# Patient Record
Sex: FEMALE | Employment: PART TIME | URBAN - METROPOLITAN AREA
[De-identification: names, ages, dates, MRNs, and addresses within clinical notes are randomized per-mention and may not be internally consistent; named-entity substitution may affect disease eponyms.]

---

## 2024-09-27 ENCOUNTER — OFFICE VISIT (OUTPATIENT)
Dept: OBGYN CLINIC | Facility: CLINIC | Age: 19
End: 2024-09-27
Payer: COMMERCIAL

## 2024-09-27 VITALS
BODY MASS INDEX: 22.22 KG/M2 | HEIGHT: 63 IN | WEIGHT: 125.4 LBS | SYSTOLIC BLOOD PRESSURE: 114 MMHG | DIASTOLIC BLOOD PRESSURE: 70 MMHG

## 2024-09-27 DIAGNOSIS — Z30.011 ENCOUNTER FOR BCP (BIRTH CONTROL PILLS) INITIAL PRESCRIPTION: ICD-10-CM

## 2024-09-27 DIAGNOSIS — N92.6 IRREGULAR PERIODS: ICD-10-CM

## 2024-09-27 DIAGNOSIS — Z11.3 ROUTINE SCREENING FOR STI (SEXUALLY TRANSMITTED INFECTION): ICD-10-CM

## 2024-09-27 DIAGNOSIS — Z12.4 CERVICAL CANCER SCREENING: ICD-10-CM

## 2024-09-27 DIAGNOSIS — Z01.419 ENCOUNTER FOR ANNUAL ROUTINE GYNECOLOGICAL EXAMINATION: Primary | ICD-10-CM

## 2024-09-27 PROCEDURE — S0610 ANNUAL GYNECOLOGICAL EXAMINA: HCPCS | Performed by: OBSTETRICS & GYNECOLOGY

## 2024-09-27 RX ORDER — NORETHINDRONE ACETATE AND ETHINYL ESTRADIOL 1MG-20(21)
1 KIT ORAL DAILY
Qty: 28 TABLET | Refills: 3 | Status: SHIPPED | OUTPATIENT
Start: 2024-09-27

## 2024-09-27 NOTE — PATIENT INSTRUCTIONS
Starting Birth Control Pills:   - start after next period starts   - timing of starting pills - begin at start of pack  you can start first day of period and it is immediately effective as contraception, or   you can start weekend after period, and it is not effective for first 2 weeks as a birth control method, use condoms or abstain     - Maintain healthy weight with BMI ideally between 18-25.    - Eat a healthy diet, including multiple servings of vegetables and fruits, as well as lean protein sources.  - Get at least 150 minutes of moderate aerobic activity or 75 minutes of vigorous aerobic activity a week, or a combination of moderate and vigorous activity.  Greater amounts of exercise will provide an even greater health benefit.   - Ensure diet provides 1200mg of Calcium daily (divided) and 800IU of vitamin D, or take supplements to meet this.  - Safe sex practices recommended.    - Resources - information on birth control and sexually transmitted infections - www.bedsider.org            - information on sexually transmitted infections - www.cdc.gov/std/

## 2024-09-27 NOTE — PROGRESS NOTES
Annual Wellness Visit  [facility] - [address], [address], [place] [address]    ASSESSMENT/PLAN: Olena Reese is a 19 y.o.  who presents for annual gynecologic exam.    Encounter for routine gynecologic examination  - Routine well woman exam completed today.  - Cervical Cancer Screening: Current ASCCP Guidelines reviewed. Last Pap: not done.  Past abnormal pap: none.  Next Pap Due: today.  - HPV Vaccination status: Immunization series complete  - STI screening offered including HIV testing: GC/CT done, others declined  - Contraceptive counseling discussed.  Current contraception: condoms, withdrawal  - The following were reviewed in today's visit: STD testing, use and side effects of OCPs, exercise, and healthy diet    Additional problems addressed during this visit:  1. Encounter for annual routine gynecological examination  2. Cervical cancer screening  -     IGP, CtNg, rfx Aptima HPV ASCU  3. Routine screening for STI (sexually transmitted infection)  -     IGP, CtNg, rfx Aptima HPV ASCU  4. Encounter for BCP (birth control pills) initial prescription  -     norethindrone-ethinyl estradiol (JUNEL FE 1/20) 1-20 MG-MCG per tablet; Take 1 tablet by mouth daily  5. Irregular periods  Assessment & Plan:  History irregular periods.  Discussed oligomenorrhea (menstrual periods > q35 days, with only 4-9 periods/year).   Reviewed possible causes including pregnancy, PCOS, thyroid, pituitary disorders,  functional hypothylamic amenorrhea, or menopause.   Discussed labs and ultrasound to try to investigate cause.  Interested in starting OCP for contraception.  Discussed recom labs and u/s before starting OCP for accuracy, but if does not wish to do labs or ultrasound that is fine.  Orders provided. Please check insurance for coverage.  Orders:  -     US pelvis complete w transvaginal; Future  -     17-Hydroxyprogesterone; Future  -     DHEA-sulfate; Future  -     Estradiol  "Sensitive LC/MS; Future  -     Follicle stimulating hormone; Future  -     Luteinizing hormone; Future  -     Pregnancy Test (HCG Qualitative); Future  -     Progesterone; Future  -     Prolactin; Future  -     Testosterone, free, total; Future  -     TSH, 3rd generation with Free T4 reflex; Future  -     17-Hydroxyprogesterone  -     DHEA-sulfate  -     Estradiol Sensitive LC/MS  -     Follicle stimulating hormone  -     Luteinizing hormone  -     Pregnancy Test (HCG Qualitative)  -     Progesterone  -     Prolactin  -     Testosterone, free, total  -     TSH, 3rd generation with Free T4 reflex      Next visit: 1 year Wellness      CC:  Annual Gynecologic Examination    HPI: Olena Reese is a 19 y.o.  who presents for annual gynecologic examination.  Olena presents today for her first gyn appointment, wishes to discuss birth control and history of irregular period.    Menarche at 13yo.    Has always been irregular.  \"I never know when I'm going to get it.\"  Can vary by 4-8 weeks.    Bleeding can vary from 2 days to a week, flow varies.  \"Very unpredictable.\"  Denies problems with weight changes.  Does have acne around periods only.  No facial hair.    Sexually active.  New partner in last year.  Using condoms or withdrawal method.          Gyn History  Patient's last menstrual period was 2024 (approximate).     Last Pap: today    She  reports being sexually active and has had partner(s) who are male. She reports using the following methods of birth control/protection: Condom and Coitus interruptus.       OB History      Past Medical History:  No date: Eczema      Comment:  seeing derm, using topical creams     Past Surgical History:  No date: WISDOM TOOTH EXTRACTION; Bilateral     History reviewed. No pertinent family history.     Social History     Tobacco Use    Smoking status: Never     Passive exposure: Never    Smokeless tobacco: Never   Substance Use Topics    Alcohol use: Yes    " " Alcohol/week: 5.0 standard drinks of alcohol     Types: 1 Glasses of wine, 2 Cans of beer, 2 Shots of liquor per week    Drug use: Not Currently        No current outpatient medications on file.    She has No Known Allergies..    ROS negative except as noted in HPI    Objective:  /70 (BP Location: Left arm, Patient Position: Sitting, Cuff Size: Standard)   Ht 5' 3\" (1.6 m)   Wt 56.9 kg (125 lb 6.4 oz)   LMP 08/22/2024 (Approximate) Comment: irregular  BMI 22.21 kg/m²      Physical Exam  Constitutional:       Appearance: Normal appearance.   Chest:   Breasts:     Right: Normal. No mass or tenderness.      Left: Normal. No mass or tenderness.   Abdominal:      Palpations: Abdomen is soft.      Tenderness: There is no abdominal tenderness.   Genitourinary:     General: Normal vulva.      Vagina: No bleeding or lesions.      Cervix: Normal.      Uterus: Normal. Not tender.       Adnexa:         Right: No mass or tenderness.          Left: No mass or tenderness.        Rectum: No external hemorrhoid.   Musculoskeletal:         General: Normal range of motion.   Lymphadenopathy:      Upper Body:      Right upper body: No axillary adenopathy.      Left upper body: No axillary adenopathy.   Neurological:      Mental Status: She is alert and oriented to person, place, and time.   Psychiatric:         Mood and Affect: Mood normal.         Behavior: Behavior normal.         "

## 2024-09-27 NOTE — ASSESSMENT & PLAN NOTE
History irregular periods.  Discussed oligomenorrhea (menstrual periods > q35 days, with only 4-9 periods/year).   Reviewed possible causes including pregnancy, PCOS, thyroid, pituitary disorders,  functional hypothylamic amenorrhea, or menopause.   Discussed labs and ultrasound to try to investigate cause.  Interested in starting OCP for contraception.  Discussed recom labs and u/s before starting OCP for accuracy, but if does not wish to do labs or ultrasound that is fine.  Orders provided.

## 2024-10-02 LAB
C TRACH RRNA CVX QL NAA+PROBE: NEGATIVE
CYTOLOGIST CVX/VAG CYTO: NORMAL
DX ICD CODE: NORMAL
N GONORRHOEA RRNA CVX QL NAA+PROBE: NEGATIVE
OTHER STN SPEC: NORMAL
OTHER STN SPEC: NORMAL
PATH REPORT.FINAL DX SPEC: NORMAL
SL AMB NOTE:: NORMAL
SL AMB SPECIMEN ADEQUACY: NORMAL
SL AMB TEST METHODOLOGY: NORMAL